# Patient Record
Sex: FEMALE | Race: WHITE | NOT HISPANIC OR LATINO | URBAN - METROPOLITAN AREA
[De-identification: names, ages, dates, MRNs, and addresses within clinical notes are randomized per-mention and may not be internally consistent; named-entity substitution may affect disease eponyms.]

---

## 2022-10-05 ENCOUNTER — OFFICE VISIT (OUTPATIENT)
Dept: CARDIOLOGY | Facility: CLINIC | Age: 47
End: 2022-10-05

## 2022-10-05 VITALS
WEIGHT: 155 LBS | HEIGHT: 64 IN | DIASTOLIC BLOOD PRESSURE: 84 MMHG | HEART RATE: 66 BPM | SYSTOLIC BLOOD PRESSURE: 118 MMHG | BODY MASS INDEX: 26.46 KG/M2

## 2022-10-05 DIAGNOSIS — R07.89 CHEST PAIN, ATYPICAL: Primary | ICD-10-CM

## 2022-10-05 DIAGNOSIS — Z72.0 TOBACCO USE: ICD-10-CM

## 2022-10-05 DIAGNOSIS — I10 PRIMARY HYPERTENSION: ICD-10-CM

## 2022-10-05 PROCEDURE — 99244 OFF/OP CNSLTJ NEW/EST MOD 40: CPT | Performed by: INTERNAL MEDICINE

## 2022-10-05 PROCEDURE — 93000 ELECTROCARDIOGRAM COMPLETE: CPT | Performed by: INTERNAL MEDICINE

## 2022-10-05 RX ORDER — AMLODIPINE BESYLATE 5 MG/1
5 TABLET ORAL DAILY
COMMUNITY
Start: 2022-09-24

## 2022-10-05 RX ORDER — OMEPRAZOLE 20 MG/1
20 CAPSULE, DELAYED RELEASE ORAL DAILY
Qty: 30 CAPSULE | Refills: 1 | Status: SHIPPED | OUTPATIENT
Start: 2022-10-05

## 2022-10-05 RX ORDER — ONDANSETRON 4 MG/1
4 TABLET, ORALLY DISINTEGRATING ORAL EVERY 8 HOURS PRN
COMMUNITY
Start: 2022-09-26

## 2022-10-05 RX ORDER — FLUTICASONE PROPIONATE 220 UG/1
1 AEROSOL, METERED RESPIRATORY (INHALATION) 2 TIMES DAILY
COMMUNITY
Start: 2022-09-24

## 2022-10-05 NOTE — PROGRESS NOTES
Subjective:     Encounter Date:10/05/2022    Primary Care Physician: Alyx Rajput MD      Patient ID: Leah Nicolas is a 46 y.o. female.    Chief Complaint:Chest Pain    PROBLEM LIST:  1. Chest pain  a. 2019 normal routine GXT  2. Hypertension  3. Tobacco abuse  4. GERD  5. Skin cancer with removal  6. Anxiety/depression  7. Surgeries:  a. Cholecystectomy  b. Right knee surgery  c. Left wrist surgery    Allergies   Allergen Reactions   • Hydrochlorothiazide Unknown - Low Severity   • Mobic [Meloxicam] Nausea And Vomiting         Current Outpatient Medications:   •  amLODIPine (NORVASC) 5 MG tablet, Take 5 mg by mouth Daily., Disp: , Rfl:   •  Flovent  MCG/ACT inhaler, Inhale 1 puff 2 (Two) Times a Day., Disp: , Rfl:   •  ondansetron ODT (ZOFRAN-ODT) 4 MG disintegrating tablet, Place 4 mg under the tongue Every 8 (Eight) Hours As Needed., Disp: , Rfl:         History of Present Illness    Patient is a 46-year-old female who we are seeing today for further evaluation of chest pain.  She has no previous history of coronary disease.  Patient notes over the last year he has been having recurrent left-sided chest pain.  This is almost constant in nature.  No specific triggering or alleviating factors.  Has some occasional radiation to her left shoulder and occasional associated dizziness and shortness of breath.  Notes that this can be helped at times with drinking water.  She denies any increase shortness of breath with activity.  Again, her chest pain is not necessarily made worse with activity.  Also has some occasional palpitations.  Feels like an occasional fast beat then pause.  Tends to get better with activity.  She drinks mainly 1-2 sodas per day.  Denies any alcohol or substance abuse.  Given her symptoms she was referred here for further evaluation.    The following portions of the patient's history were reviewed and updated as appropriate: allergies, current medications, past family history,  "past medical history, past social history, past surgical history and problem list.    Family History   Problem Relation Age of Onset   • Heart attack Neg Hx        Social History     Tobacco Use   • Smoking status: Current Every Day Smoker     Packs/day: 1.00     Years: 12.00     Pack years: 12.00   Substance Use Topics   • Alcohol use: Never   • Drug use: Never         Review of Systems   Constitutional: Negative for fever and malaise/fatigue.   HENT: Negative for nosebleeds.    Eyes: Positive for blurred vision. Negative for redness and visual disturbance.   Cardiovascular: Positive for chest pain. Negative for orthopnea, palpitations and paroxysmal nocturnal dyspnea.   Respiratory: Negative for cough, snoring, sputum production and wheezing.    Hematologic/Lymphatic: Negative for bleeding problem.   Skin: Negative for flushing, itching and rash.   Musculoskeletal: Negative for falls, joint pain and muscle cramps.   Gastrointestinal: Negative for abdominal pain, diarrhea, heartburn, nausea and vomiting.   Genitourinary: Negative for hematuria.   Neurological: Positive for dizziness. Negative for excessive daytime sleepiness, headaches, tremors and weakness.   Psychiatric/Behavioral: Positive for depression. Negative for substance abuse. The patient is nervous/anxious.           Objective:   /84   Pulse 66   Ht 162.6 cm (64\")   Wt 70.3 kg (155 lb)   BMI 26.61 kg/m²         Vitals reviewed.   Constitutional:       Appearance: Healthy appearance. Well-developed and not in distress.   Eyes:      Conjunctiva/sclera: Conjunctivae normal.      Pupils: Pupils are equal, round, and reactive to light.   HENT:      Head: Normocephalic and atraumatic.    Mouth/Throat:      Pharynx: Oropharynx is clear.   Neck:      Thyroid: Thyroid normal. No thyromegaly.      Vascular: Normal carotid pulses. No carotid bruit or JVD. JVD normal.      Lymphadenopathy: No cervical adenopathy.   Pulmonary:      Effort: No respiratory " distress.      Breath sounds: No wheezing. No rales.   Chest:      Chest wall: Not tender to palpatation.   Cardiovascular:      Normal rate. Regular rhythm.      No gallop.   Pulses:     Carotid: 2+ bilaterally.     Dorsalis pedis: 2+ bilaterally.     Posterior tibial: 2+ bilaterally.  Abdominal:      General: There is no distension or abdominal bruit.      Palpations: There is no abdominal mass.      Tenderness: There is no abdominal tenderness. There is no rebound.   Musculoskeletal:         General: No tenderness or deformity.      Extremities: No clubbing present.Skin:     General: Skin is warm and dry. There is no cyanosis.      Findings: No rash.   Neurological:      Mental Status: Alert, oriented to person, place, and time and oriented to person, place and time.           ECG 12 Lead    Date/Time: 10/5/2022 11:19 AM  Performed by: Jalen Cali MD  Authorized by: Jalen Cali MD   Comparison: not compared with previous ECG   Previous ECG: no previous ECG available  Rhythm: sinus rhythm  Other findings: non-specific ST-T wave changes    Clinical impression: abnormal EKG                  Assessment:   Assessment & Plan      Diagnoses and all orders for this visit:    1. Chest pain, atypical (Primary)  -     ECG 12 Lead    2. Primary hypertension    3. Tobacco use      1.  Chest pain, somewhat atypical.  Has been persistent.  Unclear etiology  2.  Hypertension controlled on amlodipine  3.  Ongoing tobacco abuse    Recommendations:  1.  Discussed options with patient at length today.  She has somewhat atypical findings, but enough to be worrisome for possible ischemia.  We will therefore perform an echocardiogram and a stress echocardiogram at her earliest convenience.  2.  Smoking cessation.  3.  Continue current medical therapy  4.  Omeprazole 40 mg daily trial.  5.  Further recommendations after the above       Kena HUTCHINS scribed portions of this dictation for Dr. Jalen aCli.   I have  seen and examined the patient, I have reviewed the note, discussed the case with the advance practice clinician, made necessary changes and I agree with the final note.    Jalen Cali MD  10/05/22  11:33 EDT        Dictated utilizing Dragon dictation